# Patient Record
Sex: MALE | URBAN - METROPOLITAN AREA
[De-identification: names, ages, dates, MRNs, and addresses within clinical notes are randomized per-mention and may not be internally consistent; named-entity substitution may affect disease eponyms.]

---

## 2021-01-05 ENCOUNTER — APPOINTMENT (OUTPATIENT)
Dept: URBAN - METROPOLITAN AREA CLINIC 260 | Age: 21
Setting detail: DERMATOLOGY
End: 2021-01-05

## 2021-01-05 VITALS — WEIGHT: 195 LBS | RESPIRATION RATE: 16 BRPM | HEIGHT: 71 IN

## 2021-01-05 DIAGNOSIS — L92.3 FOREIGN BODY GRANULOMA OF THE SKIN AND SUBCUTANEOUS TISSUE: ICD-10-CM

## 2021-01-05 DIAGNOSIS — L42 PITYRIASIS ROSEA: ICD-10-CM

## 2021-01-05 PROBLEM — L30.9 DERMATITIS, UNSPECIFIED: Status: ACTIVE | Noted: 2021-01-05

## 2021-01-05 PROCEDURE — OTHER PRESCRIPTION: OTHER

## 2021-01-05 PROCEDURE — 99203 OFFICE O/P NEW LOW 30 MIN: CPT | Mod: 25

## 2021-01-05 PROCEDURE — OTHER PRESCRIPTION MEDICATION MANAGEMENT: OTHER

## 2021-01-05 PROCEDURE — OTHER FOREIGN BODY REMOVAL: OTHER

## 2021-01-05 PROCEDURE — OTHER ADDITIONAL NOTES: OTHER

## 2021-01-05 PROCEDURE — OTHER COUNSELING: OTHER

## 2021-01-05 PROCEDURE — 10120 INC&RMVL FB SUBQ TISS SMPL: CPT

## 2021-01-05 RX ORDER — TRIAMCINOLONE ACETONIDE 1 MG/G
OINTMENT TOPICAL BID
Qty: 1 | Refills: 2 | Status: ERX | COMMUNITY
Start: 2021-01-05

## 2021-01-05 RX ORDER — MOMETASONE FUROATE 1 MG/ML
SOLUTION TOPICAL BID
Qty: 1 | Refills: 2 | Status: ERX | COMMUNITY
Start: 2021-01-05

## 2021-01-05 ASSESSMENT — LOCATION ZONE DERM
LOCATION ZONE: TRUNK
LOCATION ZONE: FINGER

## 2021-01-05 ASSESSMENT — LOCATION DETAILED DESCRIPTION DERM
LOCATION DETAILED: LEFT SUPERIOR MEDIAL MIDBACK
LOCATION DETAILED: LEFT PROXIMAL PALMAR MIDDLE FINGER
LOCATION DETAILED: PERIUMBILICAL SKIN

## 2021-01-05 ASSESSMENT — SEVERITY ASSESSMENT: SEVERITY: MILD TO MODERATE

## 2021-01-05 ASSESSMENT — LOCATION SIMPLE DESCRIPTION DERM
LOCATION SIMPLE: LEFT MIDDLE FINGER
LOCATION SIMPLE: ABDOMEN
LOCATION SIMPLE: LEFT LOWER BACK

## 2021-01-05 NOTE — HPI: RASH
What Type Of Note Output Would You Prefer (Optional)?: Bullet Format
Is The Patient Presenting As Previously Scheduled?: Yes
How Severe Is Your Rash?: moderate
Is This A New Presentation, Or A Follow-Up?: Rash
Additional History: Lotrimin on the scalp was not helpful.

## 2021-01-05 NOTE — PROCEDURE: PRESCRIPTION MEDICATION MANAGEMENT
Initiate Treatment: Apply Triamcinolone ointment twice daily to the abdomen and back, apply Mometasone solution twice daily to scalp.
Detail Level: Zone
Render In Strict Bullet Format?: No

## 2021-01-05 NOTE — PROCEDURE: ADDITIONAL NOTES
Additional Notes: Recommend applying Vaseline and keep it cover with bandage. Recommend see urgent care if it gets worse.
Detail Level: Simple
Render Risk Assessment In Note?: no